# Patient Record
Sex: FEMALE | Race: WHITE
[De-identification: names, ages, dates, MRNs, and addresses within clinical notes are randomized per-mention and may not be internally consistent; named-entity substitution may affect disease eponyms.]

---

## 2019-11-16 ENCOUNTER — HOSPITAL ENCOUNTER (EMERGENCY)
Dept: HOSPITAL 59 - ER | Age: 24
Discharge: HOME | End: 2019-11-16
Payer: MEDICAID

## 2019-11-16 DIAGNOSIS — F17.210: ICD-10-CM

## 2019-11-16 DIAGNOSIS — R07.2: Primary | ICD-10-CM

## 2019-11-16 DIAGNOSIS — R10.84: ICD-10-CM

## 2019-11-16 LAB
ABSOLUTE NEUTROPHIL COUNT: 2.31
ALBUMIN SERPL-MCNC: 5.4 G/DL (ref 4–5)
ALBUMIN/GLOB SERPL: 1.8 {RATIO} (ref 1.1–1.8)
ALP SERPL-CCNC: 79 U/L (ref 35–104)
ALT SERPL-CCNC: 20 U/L (ref ?–33)
ANION GAP SERPL CALC-SCNC: 14 MMOL/L (ref 7–16)
APPEARANCE UR: CLEAR
AST SERPL-CCNC: 27 U/L (ref 10–35)
BARBITURATE SCREEN URINE: NOT DETECTED
BASOPHILS NFR BLD: 0.4 % (ref 0–6)
BENZODIAZEPINE SCREEN URINE: NOT DETECTED
BILIRUB SERPL-MCNC: 0.5 MG/DL (ref 0.2–1)
BILIRUB UR-MCNC: NEGATIVE MG/DL
BUN SERPL-MCNC: 14 MG/DL (ref 6–20)
CARDIOLIPIN IGM SER IA-ACNC: NOT DETECTED
CARDIOLIPIN IGM SER IA-ACNC: NOT DETECTED
CK MB SERPL-MCNC: 1.6 NG/ML (ref ?–3.77)
CO2 SERPL-SCNC: 27 MMOL/L (ref 22–29)
COLOR UR: YELLOW
CREAT SERPL-MCNC: 0.6 MG/DL (ref 0.5–0.9)
CREATINE PHOSPHOKINASE: 108 U/L (ref 26–192)
EOSINOPHIL NFR BLD: 1.3 % (ref 0–6)
ERYTHROCYTE [DISTWIDTH] IN BLOOD BY AUTOMATED COUNT: 12.3 % (ref 11.5–14.5)
EST GLOMERULAR FILTRATION RATE: > 60 ML/MIN
GLOBULIN SER-MCNC: 3 GM/DL (ref 1.4–4.8)
GLUCOSE SERPL-MCNC: 99 MG/DL (ref 74–109)
GLUCOSE UR STRIP-MCNC: NEGATIVE MG/DL
GRANULOCYTES NFR BLD: 43.9 % (ref 47–80)
HCT VFR BLD CALC: 42.1 % (ref 35–47)
HGB BLD-MCNC: 14.2 GM/DL (ref 11.6–16)
KETONES UR QL STRIP: NEGATIVE
LYMPHOCYTES NFR BLD AUTO: 48.9 % (ref 16–45)
MCH RBC QN AUTO: 31.1 PG (ref 27–33)
MCHC RBC AUTO-ENTMCNC: 33.7 G/DL (ref 32–36)
MCV RBC AUTO: 92.1 FL (ref 81–97)
METHADONE SCREEN URINE: NOT DETECTED
MONOCYTES NFR BLD: 5.5 % (ref 0–9)
NITRITE UR QL STRIP: NEGATIVE
OPIATE SCREEN URINE: NOT DETECTED
PF4 HEPARIN CMPLX AB SER-ACNC: NOT DETECTED
PF4 HEPARIN CMPLX AB SER-ACNC: NOT DETECTED
PHENCYCLIDINE SCREEN URINE: NOT DETECTED
PLATELET # BLD: 250 K/UL (ref 130–400)
PMV BLD AUTO: 9.6 FL (ref 7.4–10.4)
PROPOXYPHENE SCREEN URINE: NOT DETECTED
PROT SERPL-MCNC: 8.4 G/DL (ref 6.6–8.7)
PROT UR QL STRIP: NEGATIVE
RBC # BLD AUTO: 4.57 M/UL (ref 3.8–5.4)
RBC # UR STRIP: NEGATIVE /UL
THC SCREEN URINE: DETECTED
TRICYCLIC ANTIDEPRESSANT SCRN: NOT DETECTED
URINE LEUKOCYTE ESTERASE: NEGATIVE
UROBILINOGEN UR STRIP-ACNC: 0.2 E.U./DL (ref 0.2–1)
WBC # BLD AUTO: 5.3 K/UL (ref 4.2–12.2)

## 2019-11-16 PROCEDURE — 80305 DRUG TEST PRSMV DIR OPT OBS: CPT

## 2019-11-16 PROCEDURE — 84484 ASSAY OF TROPONIN QUANT: CPT

## 2019-11-16 PROCEDURE — 81003 URINALYSIS AUTO W/O SCOPE: CPT

## 2019-11-16 PROCEDURE — 82553 CREATINE MB FRACTION: CPT

## 2019-11-16 PROCEDURE — 93005 ELECTROCARDIOGRAM TRACING: CPT

## 2019-11-16 PROCEDURE — 71046 X-RAY EXAM CHEST 2 VIEWS: CPT

## 2019-11-16 PROCEDURE — 82550 ASSAY OF CK (CPK): CPT

## 2019-11-16 PROCEDURE — 96374 THER/PROPH/DIAG INJ IV PUSH: CPT

## 2019-11-16 PROCEDURE — 81025 URINE PREGNANCY TEST: CPT

## 2019-11-16 PROCEDURE — 93010 ELECTROCARDIOGRAM REPORT: CPT

## 2019-11-16 PROCEDURE — 80053 COMPREHEN METABOLIC PANEL: CPT

## 2019-11-16 PROCEDURE — 85651 RBC SED RATE NONAUTOMATED: CPT

## 2019-11-16 PROCEDURE — 85025 COMPLETE CBC W/AUTO DIFF WBC: CPT

## 2019-11-16 PROCEDURE — 85379 FIBRIN DEGRADATION QUANT: CPT

## 2019-11-16 PROCEDURE — 99284 EMERGENCY DEPT VISIT MOD MDM: CPT

## 2019-11-16 NOTE — RADIOLOGY REPORT
EXAMINATION: Two View Chest Radiographs

EXAM DATE:  11/16/2019 8:16 PM



TECHNIQUE:  Frontal and lateral views



INDICATION:  cp

COMPARISON:  None



ENCOUNTER: Not applicable

_________________________



FINDINGS:



The heart, mediastinum, and pulmonary vasculature are normal.   No lung consolidation or pleural effu
sions are present.

_________________________



IMPRESSION:



No acute cardiopulmonary disease is present.



Dictated by: Lance Lozada MD on 11/16/2019 8:18 PM.

Electronically signed by: Lance Lozada MD on 11/16/2019 8:18 PM.

## 2019-12-20 ENCOUNTER — HOSPITAL ENCOUNTER (EMERGENCY)
Dept: HOSPITAL 59 - ER | Age: 24
Discharge: HOME | End: 2019-12-20
Payer: MEDICAID

## 2019-12-20 DIAGNOSIS — F17.210: ICD-10-CM

## 2019-12-20 DIAGNOSIS — R30.0: Primary | ICD-10-CM

## 2019-12-20 LAB
AMORPH SED URNS QL MICRO: (no result)
APPEARANCE UR: (no result)
BACTERIA #/AREA URNS HPF: (no result) /[HPF]
BILIRUB UR-MCNC: NEGATIVE MG/DL
COLOR UR: YELLOW
GLUCOSE UR STRIP-MCNC: NEGATIVE MG/DL
HCG,QUALITATIVE URINE: NEGATIVE
KETONES UR QL STRIP: NEGATIVE
NITRITE UR QL STRIP: NEGATIVE
PROT UR QL STRIP: NEGATIVE
RBC # UR STRIP: (no result) /UL
RBC #/AREA URNS HPF: (no result) /[HPF]
URINE LEUKOCYTE ESTERASE: (no result)
UROBILINOGEN UR STRIP-ACNC: 0.2 E.U./DL (ref 0.2–1)
WBC #/AREA URNS HPF: (no result) /[HPF]

## 2019-12-20 PROCEDURE — 99283 EMERGENCY DEPT VISIT LOW MDM: CPT

## 2019-12-20 PROCEDURE — 81025 URINE PREGNANCY TEST: CPT

## 2019-12-20 PROCEDURE — 81001 URINALYSIS AUTO W/SCOPE: CPT

## 2019-12-20 NOTE — EMERGENCY DEPARTMENT RECORD
History of Present Illness





- General


Chief complaint: Female Urogenital Problem


Stated complaint: UTI?


Time Seen by Provider: 19 16:59


Source: Patient


Mode of Arrival: Ambulatory


Limitations: No limitations





- History of Present Illness


Initial comments: 


The patient is here due to a 2 day hx of burning with urination. She denies any 

AP, back pain, fever or vomiting. The symptoms all started right after she had 

intercourse for the first time 2 days ago. She also has been having irregular 

periods since going off the Dep and may be starting one now. She denies any 

vaginal bleeding. 





MD Complaint: Dysuria


Onset/Timin


-: Days(s)


Severity: Mild


Severity scale (1-10): 1


Quality: Burning


Consistency: Constant


Improves with: None


Worsens with: Urination


Associated Symptoms: Dysuria





- Related Data


Sexually active: Yes


                                  Previous Rx's











 Medication  Instructions  Recorded


 


Phenazopyridine HCl [Pyridium] 100 mg PO TID #6 tablet 19











                                    Allergies











Allergy/AdvReac Type Severity Reaction Status Date / Time


 


No Known Drug Allergies Allergy   Verified 10/21/14 11:20














Travel Screening





- Travel/Exposure Within Last 30 Days


Have you traveled within the last 30 days?: No





Review of Systems


Constitutional: Denies: Chills, Fever


Eyes: Denies: Eye discharge


ENT: Denies: Congestion


Respiratory: Denies: Cough, Dyspnea





Past Medical History





- SOCIAL HISTORY


Smoking Status: Light tobacco smoker (<10/day)





- RESPIRATORY


Hx Respiratory Disorders: No





- CARDIOVASCULAR


Hx Cardio Disorders: No





- NEURO


Hx Neuro Disorders: No





- GI


Hx GI Disorders: No





- 


Hx Genitourinary Disorders: No





- ENDOCRINE


Hx Endocrine Disorders: No


Hx Diabetes: No


Hx Thyroid Disease: No





- MUSCULOSKELETAL


Hx Musculoskeletal Disorders: No





- PSYCH


Hx Psych Problems: No





- HEMATOLOGY/ONCOLOGY


Hx Hematology/Oncology Disorders: No





Family Medical History


Any Significant Family History?: Yes


Hx Heart Disease: Father





Physical Exam





- General


General Appearance: Alert, Cooperative, No acute distress





- Head


Head exam: Atraumatic, Normocephalic, Normal inspection





- Eye


Eye exam: Normal appearance





- Neck


Neck exam: Normal inspection, Full ROM.  negative: Tenderness





- Respiratory


Respiratory exam: Normal lung sounds bilaterally.  negative: Respiratory 

distress





- Cardiovascular


Cardiovascular Exam: Regular rate, Normal rhythm, Normal heart sounds





- GI/Abdominal


GI/Abdominal exam: Soft, Normal bowel sounds, Other (Neg CVAT.).  negative: 

Rebound, Rigid, Tenderness





- Extremities


Extremities exam: Normal inspection, Full ROM, Normal capillary refill.  

negative: Tenderness





Course





                                   Vital Signs











  19





  16:42


 


Temperature 97.7 F


 


Pulse Rate 103 H


 


Respiratory 18





Rate 


 


Blood Pressure 127/85


 


Pulse Ox 99














- Reevaluation(s)


Reevaluation #1: 


I did discuss the UA with the patient that does not appear to have any signs of 

infection. She does have some RBC's there but may be starting her menses. I did 

offer to perform a pelvic exam on the patient but since she has one scheduled in

 6 days she is declining that plan. I then did discuss the need to place her on 

Pyridium for 2 days and if she is not completely better in 3 days she is to 

return to the ER for recheck.


19 17:46








Medical Decision Making





- Data Complexity


MDM Data: Labs Ordered and/or Reviewed (US: Neg for WBC's or bacteria. 

Significant RBC's)





Disposition


Disposition: Discharge


Clinical Impression: 


 Dysuria





Disposition: Home, Self-Care


Condition: (2) Stable


Instructions:  Dysuria (ED)


Additional Instructions: 


Please drink plenty of fluids and take the Pyridium. Please see your doctor for 

the pelvic exam in 6 days. Return to the ER in 3 days if not 100% improved or 

sooner for any worsening symptoms.


Prescriptions: 


Phenazopyridine HCl [Pyridium] 100 mg PO TID #6 tablet


Forms:  Patient Portal Access


Time of Disposition: 17:49





Quality





- Quality Measures


Quality Measures: N/A





- Blood Pressure Screening


View Details: Yes


Does Patient Have Any of the Following: No


Blood Pressure Classification: Pre-Hypertensive BP Reading


Systolic Measurement: 127


Diastolic Measurement: 85


Screening for High Blood Pressure: < Pre-Hypertensive BP, F/U Documented > 

[]


Pre-Hypertensive Follow-up Interventions: Referral to alternative/primary care 

provider.